# Patient Record
Sex: MALE | Race: WHITE | Employment: FULL TIME | URBAN - METROPOLITAN AREA
[De-identification: names, ages, dates, MRNs, and addresses within clinical notes are randomized per-mention and may not be internally consistent; named-entity substitution may affect disease eponyms.]

---

## 2017-11-03 ENCOUNTER — APPOINTMENT (OUTPATIENT)
Dept: GENERAL RADIOLOGY | Facility: CLINIC | Age: 52
End: 2017-11-03
Attending: EMERGENCY MEDICINE
Payer: COMMERCIAL

## 2017-11-03 ENCOUNTER — HOSPITAL ENCOUNTER (EMERGENCY)
Facility: CLINIC | Age: 52
Discharge: HOME | End: 2017-11-03
Attending: EMERGENCY MEDICINE | Admitting: EMERGENCY MEDICINE
Payer: COMMERCIAL

## 2017-11-03 ENCOUNTER — APPOINTMENT (OUTPATIENT)
Dept: CT IMAGING | Facility: CLINIC | Age: 52
End: 2017-11-03
Attending: EMERGENCY MEDICINE
Payer: COMMERCIAL

## 2017-11-03 VITALS
TEMPERATURE: 99.6 F | RESPIRATION RATE: 16 BRPM | HEART RATE: 124 BPM | DIASTOLIC BLOOD PRESSURE: 84 MMHG | WEIGHT: 205 LBS | OXYGEN SATURATION: 94 % | SYSTOLIC BLOOD PRESSURE: 128 MMHG

## 2017-11-03 DIAGNOSIS — J18.9 PNEUMONIA OF LOWER LOBE DUE TO INFECTIOUS ORGANISM, UNSPECIFIED LATERALITY: ICD-10-CM

## 2017-11-03 DIAGNOSIS — R11.2 NON-INTRACTABLE VOMITING WITH NAUSEA, UNSPECIFIED VOMITING TYPE: ICD-10-CM

## 2017-11-03 LAB
ALBUMIN SERPL-MCNC: 3.5 G/DL (ref 3.4–5)
ALP SERPL-CCNC: 59 U/L (ref 40–150)
ALT SERPL W P-5'-P-CCNC: 25 U/L (ref 0–70)
ANION GAP SERPL CALCULATED.3IONS-SCNC: 10 MMOL/L (ref 3–14)
AST SERPL W P-5'-P-CCNC: 12 U/L (ref 0–45)
BASOPHILS # BLD AUTO: 0 10E9/L (ref 0–0.2)
BASOPHILS NFR BLD AUTO: 0.1 %
BILIRUB SERPL-MCNC: 1 MG/DL (ref 0.2–1.3)
BUN SERPL-MCNC: 20 MG/DL (ref 7–30)
CALCIUM SERPL-MCNC: 8.5 MG/DL (ref 8.5–10.1)
CHLORIDE SERPL-SCNC: 102 MMOL/L (ref 94–109)
CO2 BLDCOV-SCNC: 27 MMOL/L (ref 21–28)
CO2 SERPL-SCNC: 26 MMOL/L (ref 20–32)
CREAT SERPL-MCNC: 0.82 MG/DL (ref 0.66–1.25)
D DIMER PPP FEU-MCNC: 2.2 UG/ML FEU (ref 0–0.5)
DIFFERENTIAL METHOD BLD: ABNORMAL
EOSINOPHIL # BLD AUTO: 0 10E9/L (ref 0–0.7)
EOSINOPHIL NFR BLD AUTO: 0.2 %
ERYTHROCYTE [DISTWIDTH] IN BLOOD BY AUTOMATED COUNT: 12.9 % (ref 10–15)
GFR SERPL CREATININE-BSD FRML MDRD: >90 ML/MIN/1.7M2
GLUCOSE SERPL-MCNC: 118 MG/DL (ref 70–99)
HCT VFR BLD AUTO: 37.8 % (ref 40–53)
HGB BLD-MCNC: 13.8 G/DL (ref 13.3–17.7)
IMM GRANULOCYTES # BLD: 0.1 10E9/L (ref 0–0.4)
IMM GRANULOCYTES NFR BLD: 0.5 %
LACTATE BLD-SCNC: 1.1 MMOL/L (ref 0.7–2.1)
LYMPHOCYTES # BLD AUTO: 2.3 10E9/L (ref 0.8–5.3)
LYMPHOCYTES NFR BLD AUTO: 13.4 %
MCH RBC QN AUTO: 31.7 PG (ref 26.5–33)
MCHC RBC AUTO-ENTMCNC: 36.5 G/DL (ref 31.5–36.5)
MCV RBC AUTO: 87 FL (ref 78–100)
MONOCYTES # BLD AUTO: 1.8 10E9/L (ref 0–1.3)
MONOCYTES NFR BLD AUTO: 10.4 %
NEUTROPHILS # BLD AUTO: 12.9 10E9/L (ref 1.6–8.3)
NEUTROPHILS NFR BLD AUTO: 75.4 %
NRBC # BLD AUTO: 0 10*3/UL
NRBC BLD AUTO-RTO: 0 /100
PCO2 BLDV: 38 MM HG (ref 40–50)
PH BLDV: 7.46 PH (ref 7.32–7.43)
PLATELET # BLD AUTO: 328 10E9/L (ref 150–450)
PO2 BLDV: 61 MM HG (ref 25–47)
POTASSIUM SERPL-SCNC: 3.5 MMOL/L (ref 3.4–5.3)
PROT SERPL-MCNC: 6.7 G/DL (ref 6.8–8.8)
RBC # BLD AUTO: 4.36 10E12/L (ref 4.4–5.9)
SAO2 % BLDV FROM PO2: 92 %
SODIUM SERPL-SCNC: 138 MMOL/L (ref 133–144)
WBC # BLD AUTO: 17 10E9/L (ref 4–11)

## 2017-11-03 PROCEDURE — 83605 ASSAY OF LACTIC ACID: CPT

## 2017-11-03 PROCEDURE — 25000128 H RX IP 250 OP 636: Performed by: EMERGENCY MEDICINE

## 2017-11-03 PROCEDURE — 96361 HYDRATE IV INFUSION ADD-ON: CPT

## 2017-11-03 PROCEDURE — 82803 BLOOD GASES ANY COMBINATION: CPT

## 2017-11-03 PROCEDURE — 85025 COMPLETE CBC W/AUTO DIFF WBC: CPT | Performed by: EMERGENCY MEDICINE

## 2017-11-03 PROCEDURE — 99285 EMERGENCY DEPT VISIT HI MDM: CPT | Mod: 25

## 2017-11-03 PROCEDURE — 96376 TX/PRO/DX INJ SAME DRUG ADON: CPT

## 2017-11-03 PROCEDURE — 96375 TX/PRO/DX INJ NEW DRUG ADDON: CPT | Mod: 59

## 2017-11-03 PROCEDURE — 71020 XR CHEST 2 VW: CPT

## 2017-11-03 PROCEDURE — 96374 THER/PROPH/DIAG INJ IV PUSH: CPT | Mod: 59

## 2017-11-03 PROCEDURE — 85379 FIBRIN DEGRADATION QUANT: CPT | Performed by: EMERGENCY MEDICINE

## 2017-11-03 PROCEDURE — 25000125 ZZHC RX 250: Performed by: EMERGENCY MEDICINE

## 2017-11-03 PROCEDURE — 80053 COMPREHEN METABOLIC PANEL: CPT | Performed by: EMERGENCY MEDICINE

## 2017-11-03 PROCEDURE — 71260 CT THORAX DX C+: CPT

## 2017-11-03 RX ORDER — HYDROMORPHONE HYDROCHLORIDE 1 MG/ML
0.5 INJECTION, SOLUTION INTRAMUSCULAR; INTRAVENOUS; SUBCUTANEOUS
Status: COMPLETED | OUTPATIENT
Start: 2017-11-03 | End: 2017-11-03

## 2017-11-03 RX ORDER — METOCLOPRAMIDE HYDROCHLORIDE 5 MG/ML
10 INJECTION INTRAMUSCULAR; INTRAVENOUS ONCE
Status: COMPLETED | OUTPATIENT
Start: 2017-11-03 | End: 2017-11-03

## 2017-11-03 RX ORDER — IOPAMIDOL 755 MG/ML
74 INJECTION, SOLUTION INTRAVASCULAR ONCE
Status: COMPLETED | OUTPATIENT
Start: 2017-11-03 | End: 2017-11-03

## 2017-11-03 RX ORDER — MULTIPLE VITAMINS W/ MINERALS TAB 9MG-400MCG
1 TAB ORAL DAILY
COMMUNITY

## 2017-11-03 RX ORDER — OXYCODONE AND ACETAMINOPHEN 5; 325 MG/1; MG/1
TABLET ORAL EVERY 4 HOURS PRN
COMMUNITY

## 2017-11-03 RX ORDER — ONDANSETRON 2 MG/ML
4 INJECTION INTRAMUSCULAR; INTRAVENOUS EVERY 30 MIN PRN
Status: DISCONTINUED | OUTPATIENT
Start: 2017-11-03 | End: 2017-11-04 | Stop reason: HOSPADM

## 2017-11-03 RX ORDER — METOCLOPRAMIDE 10 MG/1
10 TABLET ORAL 3 TIMES DAILY PRN
Qty: 20 TABLET | Refills: 0 | Status: SHIPPED | OUTPATIENT
Start: 2017-11-03

## 2017-11-03 RX ORDER — ONDANSETRON 4 MG/1
4 TABLET, ORALLY DISINTEGRATING ORAL EVERY 8 HOURS PRN
Qty: 10 TABLET | Refills: 0 | Status: SHIPPED | OUTPATIENT
Start: 2017-11-03 | End: 2017-11-06

## 2017-11-03 RX ORDER — LEVOFLOXACIN 750 MG/1
750 TABLET, FILM COATED ORAL DAILY
Qty: 7 TABLET | Refills: 0 | Status: SHIPPED | OUTPATIENT
Start: 2017-11-03

## 2017-11-03 RX ADMIN — PROCHLORPERAZINE EDISYLATE 10 MG: 5 INJECTION INTRAMUSCULAR; INTRAVENOUS at 18:45

## 2017-11-03 RX ADMIN — ONDANSETRON 4 MG: 2 SOLUTION INTRAMUSCULAR; INTRAVENOUS at 18:43

## 2017-11-03 RX ADMIN — METOCLOPRAMIDE 10 MG: 5 INJECTION, SOLUTION INTRAMUSCULAR; INTRAVENOUS at 20:30

## 2017-11-03 RX ADMIN — SODIUM CHLORIDE 1000 ML: 9 INJECTION, SOLUTION INTRAVENOUS at 18:41

## 2017-11-03 RX ADMIN — HYDROMORPHONE HYDROCHLORIDE 0.5 MG: 1 INJECTION, SOLUTION INTRAMUSCULAR; INTRAVENOUS; SUBCUTANEOUS at 18:40

## 2017-11-03 RX ADMIN — SODIUM CHLORIDE, PRESERVATIVE FREE 97 ML: 5 INJECTION INTRAVENOUS at 20:46

## 2017-11-03 RX ADMIN — HYDROMORPHONE HYDROCHLORIDE 0.5 MG: 1 INJECTION, SOLUTION INTRAMUSCULAR; INTRAVENOUS; SUBCUTANEOUS at 20:35

## 2017-11-03 RX ADMIN — IOPAMIDOL 74 ML: 755 INJECTION, SOLUTION INTRAVENOUS at 20:45

## 2017-11-03 ASSESSMENT — ENCOUNTER SYMPTOMS
ARTHRALGIAS: 1
VOMITING: 1
NAUSEA: 1
DIARRHEA: 0
ABDOMINAL PAIN: 0
DYSURIA: 0
FEVER: 0

## 2017-11-03 NOTE — ED AVS SNAPSHOT
Emergency Department    [address] [facility] [address]    Phone:  [phone]    Fax:  [phone]                                       Keegan Martinez   MRN: [id number]    Department:   Emergency Department   Date of Visit:  11/3/2017           Patient Information     Date Of Birth          1965        Your diagnoses for this visit were:     Pneumonia of lower lobe due to infectious organism, unspecified laterality (H) possible    Non-intractable vomiting with nausea, unspecified vomiting type        You were seen by Darrian Szymanski MD.      Follow-up Information     Follow up with Sheng Gould MD. Call in 2 days.    Specialty:  Internal Medicine    Why:  As needed    Contact information:    [facility]  [address]  [facility] [address] [phone]          Follow up with  Emergency Department.    Specialty:  EMERGENCY MEDICINE    Why:  If symptoms worsen    Contact information:    [address] [facility] [address] [phone]        Discharge Instructions         Pneumonia (Adult)  Pneumonia is an infection deep within the lungs. It is in the small air sacs (alveoli). Pneumonia may be caused by a virus or bacteria. Pneumonia caused by bacteria is usually treated with an antibiotic. Severe cases may need to be treated in the hospital. Milder cases can be treated at home. Symptoms usually start to get better during the first 2 days of treatment.    Home care  Follow these guidelines when caring for yourself at home:    Rest at home for the first 2 to 3 days, or until you feel stronger. Don t let yourself get overly tired when you go back to your activities.    Stay away from cigarette smoke - yours or other people s.    You may use acetaminophen or ibuprofen to control fever or pain, unless another medicine was prescribed. If you have chronic liver or kidney disease, talk with your healthcare provider before using  these medicines. Also talk with your provider if you ve had a stomach ulcer or gastrointestinal bleeding. Don t give aspirin to anyone younger than 18 years of age who is ill with a fever. It may cause severe liver damage.    Your appetite may be poor, so a light diet is fine.    Drink 6 to 8 glasses of fluids every day to make sure you are getting enough fluids. Beverages can include water, sport drinks, sodas without caffeine, juices, tea, or soup. Fluids will help loosen secretions in the lung. This will make it easier for you to cough up the phlegm (sputum). If you also have heart or kidney disease, check with your healthcare provider before you drink extra fluids.    Take antibiotic medicine prescribed until it is all gone, even if you are feeling better after a few days.  Follow-up care  Follow up with your healthcare provider in the next 2 to 3 days, or as advised. This is to be sure the medicine is helping you get better.  If you are 65 or older, you should get a pneumococcal vaccine and a yearly flu (influenza) shot. You should also get these vaccines if you have chronic lung disease like asthma, emphysema, or COPD. Recently, a second type of pneumonia vaccine has become available for everyone over 65 years old. This is in addition to the previous vaccine. Ask your provider about this.  When to seek medical advice  Call your healthcare provider right away if any of these occur:    You don t get better within the first 48 hours of treatment    Shortness of breath gets worse    Rapid breathing (more than 25 breaths per minute)    Coughing up blood    Chest pain gets worse with breathing    Fever of 100.4 F (38 C) or higher that doesn t get better with fever medicine    Weakness, dizziness, or fainting that gets worse    Thirst or dry mouth that gets worse    Sinus pain, headache, or a stiff neck    Chest pain not caused by coughing  Date Last Reviewed: 1/1/2017 2000-2017 [employer] [address]  [place] [address]. All rights reserved. This information is not intended as a substitute for professional medical care. Always follow your healthcare professional's instructions.          24 Hour Appointment Hotline       To make an appointment at any [facility], call [phone] ([phone]). If you don't have a family doctor or clinic, we will help you find one. [facility] clinics are conveniently located to serve the needs of you and your family.             Review of your medicines      START taking        Dose / Directions Last dose taken    levofloxacin 750 MG tablet   Commonly known as:  LEVAQUIN   Dose:  750 mg   Quantity:  7 tablet        Take 1 tablet (750 mg) by mouth daily   Refills:  0        metoclopramide 10 MG tablet   Commonly known as:  REGLAN   Dose:  10 mg   Quantity:  20 tablet        Take 1 tablet (10 mg) by mouth 3 times daily as needed (Nausea or Vomiting)   Refills:  0          CONTINUE these medicines which may have CHANGED, or have new prescriptions. If we are uncertain of the size of tablets/capsules you have at home, strength may be listed as something that might have changed.        Dose / Directions Last dose taken    * ZOFRAN ODT PO   Indication:  post op   What changed:  Another medication with the same name was added. Make sure you understand how and when to take each.        Refills:  0        * ondansetron 4 MG ODT tab   Commonly known as:  ZOFRAN ODT   Dose:  4 mg   What changed:  You were already taking a medication with the same name, and this prescription was added. Make sure you understand how and when to take each.   Quantity:  10 tablet        Take 1 tablet (4 mg) by mouth every 8 hours as needed for nausea   Refills:  0        * Notice:  This list has 2 medication(s) that are the same as other medications prescribed for you. Read the directions carefully, and ask your doctor or other care provider to review them with you.      Our records show  that you are taking the medicines listed below. If these are incorrect, please call your family doctor or clinic.        Dose / Directions Last dose taken    Multi-vitamin Tabs tablet   Dose:  1 tablet        Take 1 tablet by mouth daily   Refills:  0        NORVASC PO        Refills:  0        oxyCODONE-acetaminophen 5-325 MG per tablet   Commonly known as:  PERCOCET   Indication:  post op        Take by mouth every 4 hours as needed for moderate to severe pain   Refills:  0        PRILOSEC PO        Refills:  0        ZANTAC PO        Refills:  0                Prescriptions were sent or printed at these locations (3 Prescriptions)                   Other Prescriptions                Printed at Department/Unit printer (3 of 3)         levofloxacin (LEVAQUIN) 750 MG tablet               ondansetron (ZOFRAN ODT) 4 MG ODT tab               metoclopramide (REGLAN) 10 MG tablet                Procedures and tests performed during your visit     CBC with platelets differential    CT Chest Pulmonary Embolism w Contrast    Comprehensive metabolic panel    D dimer quantitative    ISTAT gases lactate david POCT    XR Chest 2 Views      Orders Needing Specimen Collection     None      Pending Results     Date and Time Order Name Status Description    11/3/2017 2017 CT Chest Pulmonary Embolism w Contrast Preliminary             Pending Culture Results     No orders found from 11/1/2017 to 11/4/2017.            Pending Results Instructions     If you had any lab results that were not finalized at the time of your Discharge, you can call the ED Lab Result RN at [phone]. You will be contacted by this team for any positive Lab results or changes in treatment. The nurses are available 7 days a week from 10A to 6:30P.  You can leave a message 24 hours per day and they will return your call.        Test Results From Your Hospital Stay        11/3/2017  7:08 PM      Component Results     Component Value Ref Range & Units Status     WBC 17.0 (H) 4.0 - 11.0 10e9/L Final    RBC Count 4.36 (L) 4.4 - 5.9 10e12/L Final    Hemoglobin 13.8 13.3 - 17.7 g/dL Final    Hematocrit 37.8 (L) 40.0 - 53.0 % Final    MCV 87 78 - 100 fl Final    MCH 31.7 26.5 - 33.0 pg Final    MCHC 36.5 31.5 - 36.5 g/dL Final    RDW 12.9 10.0 - 15.0 % Final    Platelet Count 328 150 - 450 10e9/L Final    Diff Method Automated Method  Final    % Neutrophils 75.4 % Final    % Lymphocytes 13.4 % Final    % Monocytes 10.4 % Final    % Eosinophils 0.2 % Final    % Basophils 0.1 % Final    % Immature Granulocytes 0.5 % Final    Nucleated RBCs 0 0 /100 Final    Absolute Neutrophil 12.9 (H) 1.6 - 8.3 10e9/L Final    Absolute Lymphocytes 2.3 0.8 - 5.3 10e9/L Final    Absolute Monocytes 1.8 (H) 0.0 - 1.3 10e9/L Final    Absolute Eosinophils 0.0 0.0 - 0.7 10e9/L Final    Absolute Basophils 0.0 0.0 - 0.2 10e9/L Final    Abs Immature Granulocytes 0.1 0 - 0.4 10e9/L Final    Absolute Nucleated RBC 0.0  Final         11/3/2017  7:22 PM      Component Results     Component Value Ref Range & Units Status    Sodium 138 133 - 144 mmol/L Final    Potassium 3.5 3.4 - 5.3 mmol/L Final    Chloride 102 94 - 109 mmol/L Final    Carbon Dioxide 26 20 - 32 mmol/L Final    Anion Gap 10 3 - 14 mmol/L Final    Glucose 118 (H) 70 - 99 mg/dL Final    Urea Nitrogen 20 7 - 30 mg/dL Final    Creatinine 0.82 0.66 - 1.25 mg/dL Final    GFR Estimate >90 >60 mL/min/1.7m2 Final    Non  GFR Calc    GFR Estimate If Black >90 >60 mL/min/1.7m2 Final    African American GFR Calc    Calcium 8.5 8.5 - 10.1 mg/dL Final    Bilirubin Total 1.0 0.2 - 1.3 mg/dL Final    Albumin 3.5 3.4 - 5.0 g/dL Final    Protein Total 6.7 (L) 6.8 - 8.8 g/dL Final    Alkaline Phosphatase 59 40 - 150 U/L Final    ALT 25 0 - 70 U/L Final    AST 12 0 - 45 U/L Final         11/3/2017  7:22 PM      Narrative     CHEST TWO VIEWS 11/3/2017 7:02 PM     HISTORY: Persistent hiccups after recent anesthesia, please r/o  aspiration or  diaphragm issue    COMPARISON: None.        Impression     IMPRESSION:  1. There is some asymmetry in the costophrenic sulci, left  costophrenic sulcus is deeper than the right. No pneumothorax is  identified. However because of this asymmetry recommend expiration  film for further evaluation to rule out a subtle left pneumothorax.   2. Retrocardiac linear opacity consistent with discoid atelectasis,  lungs otherwise clear.  3. Heart and pulmonary vessels within normal limits.  4. Comparison with any previous older outside films if available is  recommended to determine whether the asymmetric appearance of the  hemidiaphragms is chronic.    KSENIA DAVE MD               11/3/2017  7:06 PM      Component Results     Component Value Ref Range & Units Status    Ph Venous 7.46 (H) 7.32 - 7.43 pH Final    PCO2 Venous 38 (L) 40 - 50 mm Hg Final    PO2 Venous 61 (H) 25 - 47 mm Hg Final    Bicarbonate Venous 27 21 - 28 mmol/L Final    O2 Sat Venous 92 % Final    Lactic Acid 1.1 0.7 - 2.1 mmol/L Final         11/3/2017  8:13 PM      Component Results     Component Value Ref Range & Units Status    D Dimer 2.2 (H) 0.0 - 0.50 ug/ml FEU Final    This D-dimer assay is intended for use in conjunction with a clinical pretest   probability assessment model to exclude pulmonary embolism (PE) and deep   venous thrombosis (DVT) in outpatients suspected of PE or DVT. The cut-off   value is 0.5 ug/mL FEU.           11/3/2017  9:14 PM      Narrative     CT CHEST PULMONARY EMBOLISM WITH PUBPITAJ06/3/2017 8:48 PM    HISTORY: Recent surgery, hypoxia, tachycardia, chest pain, elevated  dimer.    TECHNIQUE: Axial images from thoracic inlet to diaphragm. 74 mL Isovue  -370 IV contrast. Radiation dose for this scan was reduced using  automated exposure control, adjustment of the mA and/or kV according  to patient size, or iterative reconstruction technique.    COMPARISON: 11/3/2017 chest x-ray.    FINDINGS:   Chest: No evidence for acute  pulmonary embolus. The esophagus is  diffusely distended. There is mild esophageal wall thickening  suspected. No definite mass identified but recommend follow-up  evaluation, consider follow-up esophagram or follow-up endoscopy to  rule out distal esophageal obstruction. This may just be related to  reflux or esophageal dysmotility. Esophagitis as a cause of mild  thickening is also in the differential diagnosis.    Mild dependent atelectasis. Linear retrocardiac discoid atelectasis.  No acute infiltrate. No periaortic or pelvic adenopathy.         Impression     IMPRESSION:   1. No acute pulmonary embolus.  2. Diffusely mildly distended esophagus with mild esophageal wall  thickening suspected. May be reflux related. Cannot exclude  esophagitis or even esophageal dysmotility or distal esophageal  obstruction although no definite mass is identified. Follow-up with  esophagram or endoscopy.  3. Retrocardiac left lower lobe discoid atelectasis.                Clinical Quality Measure: Blood Pressure Screening     Your blood pressure was checked while you were in the emergency department today. The last reading we obtained was  BP: (!) 126/99 . Please read the guidelines below about what these numbers mean and what you should do about them.  If your systolic blood pressure (the top number) is less than 120 and your diastolic blood pressure (the bottom number) is less than 80, then your blood pressure is normal. There is nothing more that you need to do about it.  If your systolic blood pressure (the top number) is 120-139 or your diastolic blood pressure (the bottom number) is 80-89, your blood pressure may be higher than it should be. You should have your blood pressure rechecked within a year by a primary care provider.  If your systolic blood pressure (the top number) is 140 or greater or your diastolic blood pressure (the bottom number) is 90 or greater, you may have high blood pressure. High blood pressure is  "treatable, but if left untreated over time it can put you at risk for heart attack, stroke, or kidney failure. You should have your blood pressure rechecked by a primary care provider within the next 4 weeks.  If your provider in the emergency department today gave you specific instructions to follow-up with your doctor or provider even sooner than that, you should follow that instruction and not wait for up to 4 weeks for your follow-up visit.        Thank you for choosing [facility]       Thank you for choosing [facility] for your care. Our goal is always to provide you with excellent care. Hearing back from our patients is one way we can continue to improve our services. Please take a few minutes to complete the written survey that you may receive in the mail after you visit with us. Thank you!        Screen [employer] Information     [employer] lets you send messages to your doctor, view your test results, renew your prescriptions, schedule appointments and more. To sign up, go to [email or web address] . Click on \"Log in\" on the left side of the screen, which will take you to the Welcome page. Then click on \"Sign up Now\" on the right side of the page.     You will be asked to enter the access code listed below, as well as some personal information. Please follow the directions to create your username and password.     Your access code is: [email or web address]  Expires: 2018  9:40 PM     Your access code will  in 90 days. If you need help or a new code, please call your [facility] clinic or [phone].        Care EveryWhere ID     This is your Care EveryWhere ID. This could be used by other organizations to access your [facility] medical records  [id number]        Equal Access to Services     BRYN GILBERT : Nicci Garcia, jody lucia, kait barakat. [facility] [phone].    ATENCIÓN: Si habla español, tiene a gonzales disposición servicios gratuitos de " asistencia lingüística. Avelino al [phone].    We comply with applicable federal civil rights laws and [place] laws. We do not discriminate on the basis of race, color, national origin, age, disability, sex, sexual orientation, or gender identity.            After Visit Summary       This is your record. Keep this with you and show to your community pharmacist(s) and doctor(s) at your next visit.

## 2017-11-03 NOTE — ED AVS SNAPSHOT
Emergency Department    [address] [place] [address]    Phone:  [phone]    Fax:  [phone]                                       Keegan Martinez   MRN: [id number]    Department:   Emergency Department   Date of Visit:  11/3/2017           After Visit Summary Signature Page     I have received my discharge instructions, and my questions have been answered. I have discussed any challenges I see with this plan with the nurse or doctor.    ..........................................................................................................................................  Patient/Patient Representative Signature      ..........................................................................................................................................  Patient Representative Print Name and Relationship to Patient    ..................................................               ................................................  Date                                            Time    ..........................................................................................................................................  Reviewed by Signature/Title    ...................................................              ..............................................  Date                                                            Time

## 2017-11-03 NOTE — ED PROVIDER NOTES
History     Chief Complaint:  Nausea & Vomiting    HPI   Keegan Martinez is a 52 year old male, with a history of prostate cancer and acid reflux, who presents with his wife to the ED for evaluation of nausea and vomiting with some chest pain. The patient reports his constant hiccups began after right rotator cuff surgery on Tuesday, which went well. The patient notes the pain in his arm began 2 days ago, and epigastric and chest pain began at that time as well.  This pain started in arm, radiates to chest and epigastrum, achey in nature, comes and goes, moderate in severity. + emesis x4 in last 2 days. The patient notes taking Prilosec, Gas-X, and Zofran without alleviation of symptoms. The patient reports stopping his pain regiments on Thursday. The patient denies any fevers, abdominal pain, diarrhea, dysuria, testicular pain, or rashes.      Allergies:  Penicillins      Medications:    AmLODIPine Besylate (NORVASC PO)   Omeprazole (PRILOSEC PO)   RaNITidine HCl (ZANTAC PO)   multivitamin, therapeutic with minerals (MULTI-VITAMIN) TABS tablet   Ondansetron (ZOFRAN ODT PO)   oxyCODONE-acetaminophen (PERCOCET) 5-325 MG per tablet     Past Medical History:    Acid reflux  HTN  Prostate cancer     Past Surgical History:    Rotator cuff surgery     Family History:    History reviewed. No pertinent family history.     Social History:  Smoking status: Never smoker  Presents to ED with wife  Marital Status:   [2]     Review of Systems   Constitutional: Negative for fever.   Gastrointestinal: Positive for nausea and vomiting. Negative for abdominal pain and diarrhea.   Genitourinary: Negative for dysuria and testicular pain.   Musculoskeletal: Positive for arthralgias.   Skin: Negative for rash.   All other systems reviewed and are negative.    Physical Exam     Patient Vitals for the past 24 hrs:   BP Temp Temp src Pulse Resp SpO2 Weight   11/03/17 2130 128/84 - - - - 94 % -   11/03/17 2015 - - - - - 97 % -    11/03/17 2000 130/84 - - - - - -   11/03/17 1958 - - - - - 97 % -   11/03/17 1931 - - - - - 94 % -   11/03/17 1930 (!) 126/99 - - - - - -   11/03/17 1915 - - - - - 98 % -   11/03/17 1908 - - - - - 98 % -   11/03/17 1907 137/84 - - - - - -   11/03/17 1850 - - - - - 95 % -   11/03/17 1849 - - - - - 96 % -   11/03/17 1848 122/82 - - - - - -   11/03/17 1839 - - - - - 95 % -   11/03/17 1838 - - - - - 96 % -   11/03/17 1808 122/79 99.6  F (37.6  C) Oral 124 16 96 % 93 kg (205 lb)     Physical Exam  General: Pt seen on [facility], cooperative, and alert to conversation.  Appears uncomfortable.   Eyes: Tracking well, clear conj BL  ENT: MMM, neck supple with no TTP  CV: low grade tachycardia noted  Respiratory:  Lungs are CTA BL.  No tachypnea, minimal accessory m use, but speaking in full sentences.  Abd: Soft, non tender, no guarding, no rebound. Non distended  Skin: No skin changes, no edema or rash present to extremities  Neuro: Clear speech and no facial droop.  Psych: Not RIS, no e/o AH/VH      Emergency Department Course     Imaging:  Radiographic findings were communicated with the patient and family who voiced understanding of the findings.    CT Chest Pulmonary Embolism w Contrast  IMPRESSION:   1. No acute pulmonary embolus.  2. Diffusely mildly distended esophagus with mild esophageal wall  thickening suspected. May be reflux related. Cannot exclude  esophagitis or even esophageal dysmotility or distal esophageal  obstruction although no definite mass is identified. Follow-up with  esophagram or endoscopy.  3. Retrocardiac left lower lobe discoid atelectasis.  As read by Radiology.    XR Chest 2 Views  IMPRESSION:  1. There is some asymmetry in the costophrenic sulci, left  costophrenic sulcus is deeper than the right. No pneumothorax is  identified. However because of this asymmetry recommend expiration  film for further evaluation to rule out a subtle left pneumothorax.   2. Retrocardiac  linear opacity consistent with discoid atelectasis,  lungs otherwise clear.  3. Heart and pulmonary vessels within normal limits.  4. Comparison with any previous older outside films if available is  recommended to determine whether the asymmetric appearance of the  hemidiaphragms is chronic.  As read by Radiology.    Laboratory:  ISTAT POCT: pH 7.46(H), PCO2 38(L), PO2 61(H)  D dimer: 2.2(H)  CBC: WBC 17.0(H) o/w WNL (HGB 13.8,  )  CMP: Glucose 118(H), Protein total 6.7(L), o/w WNL (Creatinine 0.82)     Interventions:  1840: Dilaudid 0.5mg IV  1841: NS 1L Bolus IV   1843: Zofran 4mg IV  1845: Compazine 10mg IV  2030: Reglan 10mg IV  2035: Dilaudid 0.5mg IV    Emergency Department Course:  Past medical records, nursing notes, and vitals reviewed.  1823: I performed an exam of the patient and obtained history, as documented above.  IV inserted and blood drawn.    The patient was sent for a chest pulmonary embolism CT and chest x-ray while in the emergency department, findings above.    1929: I rechecked the patient.    2014: I rechecked the patient. Explained findings to patient and wife.    2159: I rechecked the patient. Findings and plan explained to the Patient and spouse. Patient discharged home with instructions regarding supportive care, medications, and reasons to return. The importance of close follow-up was reviewed.     Impression & Plan      Medical Decision Making:  This is a 52 year old gentleman who presents to the emergency with nausea and vomiting after surgery some days ago. Patient does have some shortness of breath, mild tachypnea, and mild hypoxia, with this in mind and recent surgery, D dimer done and was positive. CTPA done and shows no evidence of pulmonary embolism, he does have an opacity retrocardiac, seen on x-ray as well. With low grade fever, subjective chills, rigors, and leukocytosis, I do feel as though patient does merit a course of outpatient antibiotics, and as such, will  give outpatient therapy for what appears to be community acquired pneumonia, may be a complication of sedation, and may have a component of aspiration as well. With this in mind, I will plan on treating with outpatient management and covering for PNA as well. Patient's okay with this plan, will give very clear return ED precautions, vital signs are returning to normal limits here in the emergency room, and heart rate has come down with fluids alone. Patient's also tolerating PO, and has some hiccups, which may be due to this opacification in his lungs. At any point, has a CURB65 score of 1, and I do feel comfortable sending patient to the outpatient setting to follow-up with his primary care doctor.      Diagnosis:    ICD-10-CM    1. Pneumonia of lower lobe due to infectious organism, unspecified laterality (H) J18.1     possible   2. Non-intractable vomiting with nausea, unspecified vomiting type R11.2      Disposition: Patient discharged to home with wife     Discharge Medications:  New Prescriptions    LEVOFLOXACIN (LEVAQUIN) 750 MG TABLET    Take 1 tablet (750 mg) by mouth daily    METOCLOPRAMIDE (REGLAN) 10 MG TABLET    Take 1 tablet (10 mg) by mouth 3 times daily as needed (Nausea or Vomiting)    ONDANSETRON (ZOFRAN ODT) 4 MG ODT TAB    Take 1 tablet (4 mg) by mouth every 8 hours as needed for nausea     Selene Cho  11/3/2017    EMERGENCY DEPARTMENT    I, Selene Cho, am serving as a scribe at 6:23 PM on 11/3/2017 to document services personally performed by Darrian Szymanski MD based on my observations and the provider's statements to me.        Darrian Szymanski MD  11/03/17 5825

## 2017-11-04 NOTE — DISCHARGE INSTRUCTIONS

## 2019-11-13 ENCOUNTER — RECORDS - HEALTHEAST (OUTPATIENT)
Dept: LAB | Facility: CLINIC | Age: 54
End: 2019-11-13

## 2019-11-13 LAB
ALBUMIN SERPL-MCNC: 4.6 G/DL (ref 3.5–5)
ALP SERPL-CCNC: 85 U/L (ref 45–120)
ALT SERPL W P-5'-P-CCNC: 35 U/L (ref 0–45)
AST SERPL W P-5'-P-CCNC: 23 U/L (ref 0–40)
BILIRUB DIRECT SERPL-MCNC: 0.2 MG/DL
BILIRUB SERPL-MCNC: 0.7 MG/DL (ref 0–1)
PROT SERPL-MCNC: 7.3 G/DL (ref 6–8)

## 2021-01-29 ENCOUNTER — IMMUNIZATION (OUTPATIENT)
Dept: NURSING | Facility: CLINIC | Age: 56
End: 2021-01-29
Payer: COMMERCIAL

## 2021-01-29 PROCEDURE — 91300 PR COVID VAC PFIZER DIL RECON 30 MCG/0.3 ML IM: CPT

## 2021-01-29 PROCEDURE — 0001A PR COVID VAC PFIZER DIL RECON 30 MCG/0.3 ML IM: CPT

## 2021-02-19 ENCOUNTER — IMMUNIZATION (OUTPATIENT)
Dept: NURSING | Facility: CLINIC | Age: 56
End: 2021-02-19
Attending: FAMILY MEDICINE
Payer: COMMERCIAL

## 2021-02-19 PROCEDURE — 91300 PR COVID VAC PFIZER DIL RECON 30 MCG/0.3 ML IM: CPT

## 2021-02-19 PROCEDURE — 0002A PR COVID VAC PFIZER DIL RECON 30 MCG/0.3 ML IM: CPT

## 2021-03-20 ENCOUNTER — HEALTH MAINTENANCE LETTER (OUTPATIENT)
Age: 56
End: 2021-03-20

## 2021-09-04 ENCOUNTER — HEALTH MAINTENANCE LETTER (OUTPATIENT)
Age: 56
End: 2021-09-04

## 2022-04-16 ENCOUNTER — HEALTH MAINTENANCE LETTER (OUTPATIENT)
Age: 57
End: 2022-04-16

## 2022-10-16 ENCOUNTER — HEALTH MAINTENANCE LETTER (OUTPATIENT)
Age: 57
End: 2022-10-16

## 2023-06-01 ENCOUNTER — HEALTH MAINTENANCE LETTER (OUTPATIENT)
Age: 58
End: 2023-06-01